# Patient Record
Sex: FEMALE | Race: WHITE | NOT HISPANIC OR LATINO | Employment: FULL TIME | URBAN - METROPOLITAN AREA
[De-identification: names, ages, dates, MRNs, and addresses within clinical notes are randomized per-mention and may not be internally consistent; named-entity substitution may affect disease eponyms.]

---

## 2018-12-03 ENCOUNTER — OFFICE VISIT (OUTPATIENT)
Dept: URGENT CARE | Facility: CLINIC | Age: 28
End: 2018-12-03
Payer: COMMERCIAL

## 2018-12-03 VITALS
WEIGHT: 120 LBS | DIASTOLIC BLOOD PRESSURE: 72 MMHG | TEMPERATURE: 98 F | HEART RATE: 91 BPM | HEIGHT: 64 IN | RESPIRATION RATE: 16 BRPM | BODY MASS INDEX: 20.49 KG/M2 | OXYGEN SATURATION: 97 % | SYSTOLIC BLOOD PRESSURE: 112 MMHG

## 2018-12-03 DIAGNOSIS — J98.8 RTI (RESPIRATORY TRACT INFECTION): ICD-10-CM

## 2018-12-03 DIAGNOSIS — J20.9 ACUTE BRONCHITIS WITH BRONCHOSPASM: ICD-10-CM

## 2018-12-03 PROCEDURE — 99203 OFFICE O/P NEW LOW 30 MIN: CPT | Performed by: EMERGENCY MEDICINE

## 2018-12-03 RX ORDER — DOXYCYCLINE HYCLATE 100 MG
100 TABLET ORAL 2 TIMES DAILY
Qty: 14 TAB | Refills: 0 | Status: SHIPPED | OUTPATIENT
Start: 2018-12-03 | End: 2018-12-10

## 2018-12-03 RX ORDER — PREDNISONE 20 MG/1
40 TABLET ORAL DAILY
Qty: 10 TAB | Refills: 0 | Status: SHIPPED | OUTPATIENT
Start: 2018-12-03 | End: 2020-11-03

## 2018-12-03 ASSESSMENT — ENCOUNTER SYMPTOMS
VOMITING: 0
WHEEZING: 1
COUGH: 1
SORE THROAT: 1
SPUTUM PRODUCTION: 1
SINUS PAIN: 0
FEVER: 0
DIARRHEA: 0
RHINORRHEA: 0
SHORTNESS OF BREATH: 1
HEMOPTYSIS: 0
CHILLS: 1

## 2018-12-03 NOTE — PATIENT INSTRUCTIONS
Go to the nearest hospital Emergency Department, or call 911, if any worsening breathing condition.  Bronchospasm, Adult  A bronchospasm is when the tubes that carry air in and out of your lungs (airways) spasm or tighten. During a bronchospasm it is hard to breathe. This is because the airways get smaller. A bronchospasm can be triggered by:  · Allergies. These may be to animals, pollen, food, or mold.  · Infection. This is a common cause of bronchospasm.  · Exercise.  · Irritants. These include pollution, cigarette smoke, strong odors, aerosol sprays, and paint fumes.  · Weather changes.  · Stress.  · Being emotional.  Follow these instructions at home:  · Always have a plan for getting help. Know when to call your doctor and local emergency services (911 in the [place] Know where you can get emergency care.  · Only take medicines as told by your doctor.  · If you were prescribed an inhaler or nebulizer machine, ask your doctor how to use it correctly. Always use a spacer with your inhaler if you were given one.  · Stay calm during an attack. Try to relax and breathe more slowly.  · Control your home environment:  ¨ Change your heating and air conditioning filter at least once a month.  ¨ Limit your use of fireplaces and wood stoves.  ¨ Do not  smoke. Do not  allow smoking in your home.  ¨ Avoid perfumes and fragrances.  ¨ Get rid of pests (such as roaches and mice) and their droppings.  ¨ Throw away plants if you see mold on them.  ¨ Keep your house clean and dust free.  ¨ Replace carpet with wood, tile, or vinyl carey. Carpet can trap dander and dust.  ¨ Use allergy-proof pillows, mattress covers, and box spring covers.  ¨ Wash bed sheets and blankets every week in hot water. Dry them in a dryer.  ¨ Use blankets that are made of polyester or cotton.  ¨ Wash hands frequently.  Contact a doctor if:  · You have muscle aches.  · You have chest pain.  · The thick spit you spit or cough up (sputum) changes from  clear or white to yellow, green, gray, or bloody.  · The thick spit you spit or cough up gets thicker.  · There are problems that may be related to the medicine you are given such as:  ¨ A rash.  ¨ Itching.  ¨ Swelling.  ¨ Trouble breathing.  Get help right away if:  · You feel you cannot breathe or catch your breath.  · You cannot stop coughing.  · Your treatment is not helping you breathe better.  · You have very bad chest pain.  This information is not intended to replace advice given to you by your health care provider. Make sure you discuss any questions you have with your health care provider.  Document Released: 10/15/2010 Document Revised: 05/25/2017 Document Reviewed: 06/10/2014  Elsevier Interactive Patient Education © 2017 [employer]

## 2018-12-03 NOTE — PROGRESS NOTES
Subjective:      Hawa Grimm is a 28 y.o. female who presents with Cough (x1 week productive cough, sob, sinus pressure, post nasal drippage, body aches, )             Cough    This is a new problem. Episode onset: over one week.  The problem has been gradually worsening. The cough is productive of sputum. Associated symptoms include chills, ear congestion, nasal congestion, a sore throat, shortness of breath and wheezing. Pertinent negatives include no chest pain, ear pain, fever, hemoptysis, rash or rhinorrhea.  Nothing aggravates the symptoms. She has tried a beta-agonist inhaler for the symptoms. The treatment provided moderate relief. Her past medical history is significant for bronchitis. There is no history of environmental allergies.       Review of Systems   Constitutional: Positive for chills and malaise/fatigue. Negative for fever.   HENT: Positive for congestion and sore throat. Negative for ear discharge, ear pain, hearing loss, nosebleeds, rhinorrhea, sinus pain and tinnitus.    Respiratory: Positive for cough, sputum production, shortness of breath and wheezing. Negative for hemoptysis.    Cardiovascular: Negative for chest pain.   Gastrointestinal: Negative for diarrhea and vomiting.   Skin: Negative for rash.   Endo/Heme/Allergies: Negative for environmental allergies.     PMH:  has a past medical history of Calcaneus fracture.  MEDS:   Current Outpatient Prescriptions:   •  predniSONE (DELTASONE) 20 MG Tab, Take 2 Tabs by mouth every day., Disp: 10 Tab, Rfl: 0  •  Albuterol Sulfate 108 (90 Base) MCG/ACT AEROSOL POWDER, BREATH ACTIVATED, Inhale 1-2 Puffs by mouth every 6 hours as needed., Disp: 1 Each, Rfl: 0  •  beclomethasone (QVAR) 80 MCG/ACT inhaler, Inhale 1 Puff by mouth 2 times a day. Use for 2-3 weeks, Disp: 7.3 g, Rfl: 0  •  doxycycline (VIBRAMYCIN) 100 MG Tab, Take 1 Tab by mouth 2 times a day for 7 days., Disp: 14 Tab, Rfl: 0  ALLERGIES: No Known Allergies  SURGHX:   Past Surgical  "History:   Procedure Laterality Date   • CALCANEUS ORIF  1/8/2012    Performed by RAYMOND LAZO at SURGERY [facility]     SOCHX:  reports that she has been smoking.  She has never used smokeless tobacco. She reports that she drinks alcohol. She reports that she uses drugs.  FH: family history is not on file.       Objective:     /72   Pulse 91   Temp 36.7 °C (98 °F) (Temporal)   Resp 16   Ht 1.626 m (5' 4\")   Wt 54.4 kg (120 lb)   SpO2 97%   BMI 20.60 kg/m²       Physical Exam   Constitutional: She is oriented to person, place, and time. She appears well-developed and well-nourished. She is cooperative. She does not have a sickly appearance. She does not appear ill. No distress.   HENT:   Head: Normocephalic.   Right Ear: Tympanic membrane and ear canal normal.   Left Ear: Tympanic membrane and ear canal normal.   Nose: Mucosal edema present. No rhinorrhea. Right sinus exhibits no maxillary sinus tenderness and no frontal sinus tenderness. Left sinus exhibits no maxillary sinus tenderness and no frontal sinus tenderness.   Mouth/Throat: Mucous membranes are normal. No trismus in the jaw. No uvula swelling. Posterior oropharyngeal erythema present. No oropharyngeal exudate or posterior oropharyngeal edema.   Eyes: Conjunctivae and lids are normal.   Neck: Trachea normal and phonation normal. Neck supple. No JVD present.   Cardiovascular: Normal rate, regular rhythm and normal heart sounds.    No murmur heard.  No significant pedal edema.   Pulmonary/Chest: No accessory muscle usage. No tachypnea. No respiratory distress. She has no decreased breath sounds. She has wheezes. She has no rhonchi. She has no rales.   Lymphadenopathy:     She has no cervical adenopathy.   Neurological: She is alert and oriented to person, place, and time.   Skin: Skin is warm and dry.   Psychiatric: She has a normal mood and affect.          Advise consideration of chest x-ray; patient declined, has follow-up visit " with PCP in 2 days if needed.     Assessment/Plan:     1. Acute bronchitis with bronchospasm  - predniSONE (DELTASONE) 20 MG Tab; Take 2 Tabs by mouth every day.  Dispense: 10 Tab; Refill: 0  - Albuterol Sulfate 108 (90 Base) MCG/ACT AEROSOL POWDER, BREATH ACTIVATED; Inhale 1-2 Puffs by mouth every 6 hours as needed.  Dispense: 1 Each; Refill: 0  - beclomethasone (QVAR) 80 MCG/ACT inhaler; Inhale 1 Puff by mouth 2 times a day. Use for 2-3 weeks  Dispense: 7.3 g; Refill: 0  - REFERRAL TO FOLLOW-UP WITH PRIMARY CARE    2. RTI (respiratory tract infection)  Based on duration, concern for evolving bronchopneumonia process.  - doxycycline (VIBRAMYCIN) 100 MG Tab; Take 1 Tab by mouth 2 times a day for 7 days.  Dispense: 14 Tab; Refill: 0  - REFERRAL TO FOLLOW-UP WITH PRIMARY CARE

## 2020-10-22 ENCOUNTER — HOSPITAL ENCOUNTER (OUTPATIENT)
Facility: MEDICAL CENTER | Age: 30
End: 2020-10-22
Attending: SPECIALIST | Admitting: SPECIALIST
Payer: MEDICAID

## 2020-11-03 ENCOUNTER — PRE-ADMISSION TESTING (OUTPATIENT)
Dept: ADMISSIONS | Facility: MEDICAL CENTER | Age: 30
End: 2020-11-03
Attending: SPECIALIST
Payer: MEDICAID

## 2020-11-03 RX ORDER — SERTRALINE HYDROCHLORIDE 25 MG/1
25 TABLET, FILM COATED ORAL DAILY
COMMUNITY

## 2020-11-03 SDOH — HEALTH STABILITY: MENTAL HEALTH: HOW OFTEN DO YOU HAVE 6 OR MORE DRINKS ON ONE OCCASION?: DAILY OR ALMOST DAILY

## 2020-11-10 NOTE — H&P
DATE OF PROCEDURE:  11/13/2020    IDENTIFICATION:  The patient is a very pleasant 30-year-old primipara (para 1   with 1 previous vaginal delivery and her daughter is 5 years old).    CHIEF COMPLAINT:  The patient is parous and desires permanent tubal   sterilization.    HISTORY OF PRESENT ILLNESS:  The patient is scheduled today for laparoscopic   bilateral tubal sterilization procedure, namely laparoscopic bilateral   salpingectomy.  She would also like for me to remove her IUD at the time of   laparoscopic bilateral salpingectomy.  I have discussed with her and explained   to her in detail and at length what laparoscopic bilateral salpingectomy is   and what laparoscopic bilateral salpingectomy involves and I have discussed   with her and explained to her in detail and at length the risks and benefits   and alternatives of laparoscopic bilateral salpingectomy.  After our   discussions today and after answering her questions, she told me that she very   much wishes for us to proceed with laparoscopic bilateral salpingectomy and   she asked me to remove her IUD at the time of laparoscopic bilateral   salpingectomy.    PAST MEDICAL HISTORY:  The patient has a history of asthma, but otherwise has   no medical illnesses.    PAST SURGICAL HISTORY:  The patient has had foot surgery twice and a tooth   extraction.    MEDICATIONS:  The patient says she takes sertraline and says she takes no   other medications other than over-the-counter medications for seasonal   allergies, but she does not take these every day.    ALLERGIES:  The patient says she has no known drug allergies.    SOCIAL HISTORY:  The patient occasionally smokes but infrequently and at most   maybe 1 cigarette per day.  She occasionally drinks alcoholic beverages.  She   occasionally smokes marijuana.    REVIEW OF SYSTEMS:  GENERAL:  No fevers or chills or sweats.  PULMONARY:  No coughing or wheezing or chest pain or shortness of breath other   than for  maybe some mild intermittent brief wheezing due to her asthma.  She   denies any shortness of breath.  CARDIOVASCULAR:  The patient denies any palpitations, chest pain or dyspnea.  GENITOURINARY:  The patient denies any menorrhagia or dysmenorrhea or dysuria   or hematuria.  MUSCULOSKELETAL:  No arthralgias or myalgias.  NEUROLOGIC:  No headaches or syncope or seizures.    PHYSICAL EXAMINATION:  VITAL SIGNS:  Patient's vital signs are stable and she is afebrile.  GENERAL:  Well developed, well nourished, in no apparent distress.  HEENT:  Normocephalic, atraumatic.  Pupils equal, round, reactive to light and   accommodation.  Extraocular motions intact.  NECK:  There is no thyromegaly.  CHEST AND HEART:  Regular rate and rhythm.  No murmur.  LUNGS:  Clear to auscultation bilaterally.  ABDOMEN:  The abdomen is soft and flat, and nontender and nondistended.  There   is no hepatomegaly and there is no splenomegaly.  PELVIC:  Speculum exam was performed and reveals that there are no vulvar or   vaginal or cervical lesions and no cervical or vaginal discharge.  The IUD   strings are seen at the external cervical os.  In no part of the body, the IUD   is seen.  The vulva and vaginal mucosa are well estrogenized.  Bimanual exam   reveals no evidence of cervical motion tenderness and no evidence of any   tenderness to palpation of the uterine corpus and no evidence of any adnexal   masses or tenderness either on the right or the left.  EXTREMITIES:  No clubbing, cyanosis or edema.  NEUROLOGICAL:  Nonfocal.    ASSESSMENT:  1.  The patient is parous and desires permanent tubal sterilization, namely   laparoscopic bilateral salpingectomy.  2.  The patient would like for me to remove her IUD at the time of this   procedure.    PLAN:  We will proceed with laparoscopic bilateral salpingectomy and IUD   removal.  Please see above.       ____________________________________     MD FAUSTO Cagle / NTS    DD:  11/10/2020  09:05:07  DT:  11/10/2020 09:23:54    D#:  [id number]  Job#:  [id number]

## 2020-11-11 ENCOUNTER — PRE-ADMISSION TESTING (OUTPATIENT)
Dept: ADMISSIONS | Facility: MEDICAL CENTER | Age: 30
End: 2020-11-11
Attending: SPECIALIST
Payer: MEDICAID

## 2020-11-11 DIAGNOSIS — Z01.812 PRE-OPERATIVE LABORATORY EXAMINATION: ICD-10-CM

## 2020-11-11 LAB
ANION GAP SERPL CALC-SCNC: 10 MMOL/L (ref 7–16)
BUN SERPL-MCNC: 7 MG/DL (ref 8–22)
CALCIUM SERPL-MCNC: 9.8 MG/DL (ref 8.5–10.5)
CHLORIDE SERPL-SCNC: 101 MMOL/L (ref 96–112)
CO2 SERPL-SCNC: 26 MMOL/L (ref 20–33)
COVID ORDER STATUS COVID19: NORMAL
CREAT SERPL-MCNC: 0.78 MG/DL (ref 0.5–1.4)
ERYTHROCYTE [DISTWIDTH] IN BLOOD BY AUTOMATED COUNT: 46.7 FL (ref 35.9–50)
GLUCOSE SERPL-MCNC: 101 MG/DL (ref 65–99)
HCG SERPL QL: NEGATIVE
HCT VFR BLD AUTO: 47.8 % (ref 37–47)
HGB BLD-MCNC: 15.6 G/DL (ref 12–16)
MCH RBC QN AUTO: 32.2 PG (ref 27–33)
MCHC RBC AUTO-ENTMCNC: 32.6 G/DL (ref 33.6–35)
MCV RBC AUTO: 98.6 FL (ref 81.4–97.8)
PLATELET # BLD AUTO: 251 K/UL (ref 164–446)
PMV BLD AUTO: 10.1 FL (ref 9–12.9)
POTASSIUM SERPL-SCNC: 5 MMOL/L (ref 3.6–5.5)
RBC # BLD AUTO: 4.85 M/UL (ref 4.2–5.4)
SARS-COV-2 RNA RESP QL NAA+PROBE: DETECTED
SODIUM SERPL-SCNC: 137 MMOL/L (ref 135–145)
SPECIMEN SOURCE: ABNORMAL
WBC # BLD AUTO: 4.9 K/UL (ref 4.8–10.8)

## 2020-11-11 PROCEDURE — U0003 INFECTIOUS AGENT DETECTION BY NUCLEIC ACID (DNA OR RNA); SEVERE ACUTE RESPIRATORY SYNDROME CORONAVIRUS 2 (SARS-COV-2) (CORONAVIRUS DISEASE [COVID-19]), AMPLIFIED PROBE TECHNIQUE, MAKING USE OF HIGH THROUGHPUT TECHNOLOGIES AS DESCRIBED BY CMS-2020-01-R: HCPCS

## 2020-11-11 PROCEDURE — 84703 CHORIONIC GONADOTROPIN ASSAY: CPT

## 2020-11-11 PROCEDURE — 85027 COMPLETE CBC AUTOMATED: CPT

## 2020-11-11 PROCEDURE — C9803 HOPD COVID-19 SPEC COLLECT: HCPCS

## 2020-11-11 PROCEDURE — 36415 COLL VENOUS BLD VENIPUNCTURE: CPT

## 2020-11-11 PROCEDURE — 80048 BASIC METABOLIC PNL TOTAL CA: CPT

## 2020-11-11 NOTE — OR NURSING
COVID-19 Pre-surgery screening: Preadmit testing 11/11/20    1. Do you have an undiagnosed respiratory illness or symptoms such as coughing or sneezing? no    2. Do you have an unexplained fever greater than 100.4 degrees Fahrenheit or 38 degrees Celsius? no     3. Have you had direct exposure to a patient who tested positive for Covid-19?   no    4. Have you had any loss of your sense of taste or smell? Have you had N/V or sore throat? no    Patient has been informed of visitor policy and asked to wear a mask upon entering the hospital Yes

## 2020-11-12 NOTE — OR NURSING
Pt called and informed of Covid results, she denies any symptoms at this time. Pt instructed to self isolate and contact the health department, pt verbalized understanding. Call placed to Dr Peterson' office and informed Karmen, , of positive result.

## 2023-12-29 ENCOUNTER — HOSPITAL ENCOUNTER (EMERGENCY)
Facility: MEDICAL CENTER | Age: 33
End: 2023-12-29
Attending: EMERGENCY MEDICINE
Payer: COMMERCIAL

## 2023-12-29 VITALS
RESPIRATION RATE: 18 BRPM | BODY MASS INDEX: 19.63 KG/M2 | HEART RATE: 104 BPM | TEMPERATURE: 98.2 F | DIASTOLIC BLOOD PRESSURE: 118 MMHG | HEIGHT: 64 IN | OXYGEN SATURATION: 99 % | WEIGHT: 115 LBS | SYSTOLIC BLOOD PRESSURE: 177 MMHG

## 2023-12-29 DIAGNOSIS — F14.10 COCAINE ABUSE (HCC): Primary | ICD-10-CM

## 2023-12-29 LAB — EKG IMPRESSION: NORMAL

## 2023-12-29 PROCEDURE — 99284 EMERGENCY DEPT VISIT MOD MDM: CPT

## 2023-12-29 PROCEDURE — 93005 ELECTROCARDIOGRAM TRACING: CPT | Performed by: EMERGENCY MEDICINE

## 2023-12-30 NOTE — ED NOTES
Pt appears to have eloped from ER exam room. No belongings left in room, not in restroom. MD notified.

## 2023-12-30 NOTE — ED PROVIDER NOTES
ED Provider Note    Scribed for Adrian Stiles by Trip Lyles. 12/29/2023  10:17 PM    Primary care provider: Pcp Pt States None  Means of arrival: walk in  History obtained from: Patient  History limited by: None    CHIEF COMPLAINT  Chief Complaint   Patient presents with    Psych Eval    Chest Pain    Drug Ingestion     Patient arrives after injecting cocaine last night. Patient states that needle broke off in her vein and is traveling, and she can feel it in her left arm near her bicep.     EXTERNAL RECORDS REVIEWED  Outpatient Notes Seen at urgent care on 12/11, diagnosed with sinusitis and ear infection    HPI/ROS    LIMITATION TO HISTORY   Select: : None    HPI  Hawa Grimm is a 33 y.o. female who presents to the Emergency Department for drug ingestion onset prior to arrival. She states that a piece of a needle broke off, but she could not find the piece. She rubbed her right arm against her leg and that is when she thinks the needle went into her skin. She sees a hole to the arm, and has been scratching at that area. She also notes some pain to the region. She just relapsed back onto cocaine after one week of sobriety. She has been using fairly regularly.    REVIEW OF SYSTEMS  As above, all other systems reviewed and are negative.   See HPI for further details.     PAST MEDICAL HISTORY   has a past medical history of Asthma (11/03/2020), Calcaneus fracture, and Psychiatric problem (11/03/2020).    SURGICAL HISTORY   has a past surgical history that includes calcaneus orif (1/8/2012).    SOCIAL HISTORY  Social History     Tobacco Use    Smoking status: Every Day     Current packs/day: 1.00     Average packs/day: 1 pack/day for 10.0 years (10.0 ttl pk-yrs)     Types: Cigarettes    Smokeless tobacco: Never   Substance Use Topics    Alcohol use: Yes     Comment: 1 per day    Drug use: Yes     Types: Intravenous, Injected (Skin Popping)     Comment: heroin hx      Social History     Substance and  "Sexual Activity   Drug Use Yes    Types: Intravenous, Injected (Skin Popping)    Comment: heroin hx     FAMILY HISTORY  History reviewed. No pertinent family history.  CURRENT MEDICATIONS  Home Medications       Reviewed by Monroe Waldrop R.N. (Registered Nurse) on 12/29/23 at 2115  Med List Status: Not Addressed     Medication Last Dose Status   Albuterol Sulfate 108 (90 Base) MCG/ACT AEROSOL POWDER, BREATH ACTIVATED  Active   sertraline (ZOLOFT) 25 MG tablet  Active                  ALLERGIES  No Known Allergies    PHYSICAL EXAM    VITAL SIGNS:   Vitals:    12/29/23 2106 12/29/23 2112   BP: (!) 177/118    Pulse: (!) 104    Resp: 18    Temp: 36.8 °C (98.2 °F)    TempSrc: Temporal    SpO2: 99%    Weight:  52.2 kg (115 lb)   Height:  1.626 m (5' 4\")     Vitals: My interpretation: hypertensive, borderline tachycardic, afebrile, not hypoxic    Cardiac Monitor Interpretation: The cardiac monitor revealed normal Sinus Rhythm as interpreted by me. The cardiac monitor was ordered secondary to the patient's history of cocaine usage and to monitor for dysrhythmia and/or tachycardia.    PE:   Gen: sitting comfortably, speaking clearly, appears in no acute distress   ENT: Mucous membranes moist, posterior pharynx clear, uvula midline, nares patent bilaterally   Neck: Supple, FROM  Pulmonary: Lungs are clear to auscultation bilaterally. No tachypnea  CV:  RRR, no murmur appreciated, pulses 2+ in both upper and lower extremities  Abdomen: soft, NT/ND; no rebound/guarding  : no CVA or suprapubic tenderness   Neuro: A&Ox4 (person, place, time, situation), speech fluent, gait steady, no focal deficits appreciated  Skin: No rash or lesions.  No pallor or jaundice.  No cyanosis.  Warm and dry.  Track marks are present on the arms mostly on the right, no abscess or induration, no foreign body appreciated    DIAGNOSTIC STUDIES / PROCEDURES    LABS  Results for orders placed or performed during the hospital encounter of 12/29/23   EKG "   Result Value Ref Range    Report       [facility] Emergency Dept.    Test Date:  2023  Pt Name:    HAWA DEAN                 Department: ER  MRN:        [id number]                      Room:  Gender:     Female                       Technician: [id number]  :        1990                   Requested By:ER TRIAGE PROTOCOL  Order #:    [id number]                    Reading MD: Adrian Stiles    Measurements  Intervals                                Axis  Rate:       95                           P:          69  OK:         161                          QRS:        6  QRSD:       96                           T:          33  QT:         378  QTc:        475    Interpretive Statements  Sinus rhythm  Borderline prolonged QT interval  Baseline wander in lead(s) V1,V2  No previous ECG available for comparison  Electronically Signed On 2023 23:04:31 PST by Adrian Stiles        COURSE & MEDICAL DECISION MAKING  Nursing notes, VS, PMSFHx, labs, imaging, EKG reviewed in chart.    ED Observation Status? No; Patient does not meet criteria for ED Observation.     Ddx: Cocaine induced psychosis, delusional parasitosis, foreign body, cellulitis, abscess    MDM: 10:17 PM Hawa Dean is a 33 y.o. female who presented with evaluation for possible foreign body.  Patient has heavy cocaine use, for the past week she has been injecting cocaine into her right arm.  She was concerned that maybe a needle broke off in her arm.  She is convinced it has been traveling up and down her arm through her veins or in the skin, she is unclear.  Presents here hypertensive and tachycardic likely secondary to recent cocaine use which she admits to.  She has some track marks on her arm but no obvious foreign body and low suspicion at this time for retained foreign body.  I did not palpate any abscess, induration, fluctuance and no area that appears to have foreign body retention.  I discussed with her that I  would go and get an ultrasound machine and do an ultrasound to verify that there is no metallic foreign body.  However on my return, patient had eloped.  Overall I have low suspicion and I think she was stable for discharge and would have plan to discharge her shortly afterwards.  Nursing staff unable to find the patient.  If she returns I will be happy to finish her workup.    ADDITIONAL PROBLEM LIST AND DISPOSITION    Discussion of management with other QHP or appropriate source(s): None     Escalation of care considered, and ultimately not performed:diagnostic imaging    Barriers to care at this time, including but not limited to: Patient does not have established PCP.     FINAL IMPRESSION  1. Cocaine abuse (HCC) Acute       Trip AVILEZ (Scribe), am scribing for, and in the presence of, Adrian Stiles.    Electronically signed by: Trip Lyles (Josefina), 12/29/2023    IAdrian personally performed the services described in this documentation, as scribed by Trip Lyles in my presence, and it is both accurate and complete.    The note accurately reflects work and decisions made by me.  Adrian Stiles  12/29/2023  11:05 PM

## 2023-12-30 NOTE — ED TRIAGE NOTES
"Chief Complaint   Patient presents with    Psych Eval    Chest Pain    Drug Ingestion     Patient arrives after injecting cocaine last night. Patient states that needle broke off in her vein and is traveling, and she can feel it in her left arm near her bicep.       Pt is alert and oriented, speaking in full sentences, follows commands and responds appropriately to questions. Resperations are even and unlabored.      Pt placed in lobby. Pt educated on triage process. Pt encouraged to alert staff for any changes.     Patient and staff wearing appropriate PPE.    BP (!) 177/118   Pulse (!) 104   Temp 36.8 °C (98.2 °F) (Temporal)   Resp 18   Ht 1.626 m (5' 4\")   Wt 52.2 kg (115 lb)   SpO2 99%    "

## 2023-12-30 NOTE — ED NOTES
"Pt states she broke off the needle and then she felt a prick on her forearm and believes the needle might have been on her pants. No needle visible. Pt believes needle has traveled up vein into bicep. Pt states that she keeps seeing the needle \"poke his little head out\" in different spots. Pt states she was 12 years sober and injected cocaine tonight   "

## 2024-01-13 ENCOUNTER — HOSPITAL ENCOUNTER (EMERGENCY)
Facility: MEDICAL CENTER | Age: 34
End: 2024-01-13
Attending: STUDENT IN AN ORGANIZED HEALTH CARE EDUCATION/TRAINING PROGRAM
Payer: COMMERCIAL

## 2024-01-13 ENCOUNTER — APPOINTMENT (OUTPATIENT)
Dept: RADIOLOGY | Facility: MEDICAL CENTER | Age: 34
End: 2024-01-13
Attending: STUDENT IN AN ORGANIZED HEALTH CARE EDUCATION/TRAINING PROGRAM

## 2024-01-13 VITALS
OXYGEN SATURATION: 98 % | BODY MASS INDEX: 20.49 KG/M2 | HEIGHT: 64 IN | RESPIRATION RATE: 19 BRPM | WEIGHT: 120 LBS | HEART RATE: 74 BPM | TEMPERATURE: 98.2 F | DIASTOLIC BLOOD PRESSURE: 82 MMHG | SYSTOLIC BLOOD PRESSURE: 120 MMHG

## 2024-01-13 DIAGNOSIS — F14.90 COCAINE USE: ICD-10-CM

## 2024-01-13 DIAGNOSIS — R09.A9 FOREIGN BODY SENSATION, OTHER SITE: ICD-10-CM

## 2024-01-13 DIAGNOSIS — F15.959: ICD-10-CM

## 2024-01-13 LAB — HCG UR QL: NEGATIVE

## 2024-01-13 PROCEDURE — 700102 HCHG RX REV CODE 250 W/ 637 OVERRIDE(OP): Performed by: STUDENT IN AN ORGANIZED HEALTH CARE EDUCATION/TRAINING PROGRAM

## 2024-01-13 PROCEDURE — A9270 NON-COVERED ITEM OR SERVICE: HCPCS | Performed by: STUDENT IN AN ORGANIZED HEALTH CARE EDUCATION/TRAINING PROGRAM

## 2024-01-13 PROCEDURE — 71045 X-RAY EXAM CHEST 1 VIEW: CPT

## 2024-01-13 PROCEDURE — 81025 URINE PREGNANCY TEST: CPT

## 2024-01-13 PROCEDURE — 99283 EMERGENCY DEPT VISIT LOW MDM: CPT

## 2024-01-13 PROCEDURE — 93005 ELECTROCARDIOGRAM TRACING: CPT | Performed by: STUDENT IN AN ORGANIZED HEALTH CARE EDUCATION/TRAINING PROGRAM

## 2024-01-13 RX ORDER — DIAZEPAM 2 MG/1
2 TABLET ORAL ONCE
Status: COMPLETED | OUTPATIENT
Start: 2024-01-13 | End: 2024-01-13

## 2024-01-13 RX ADMIN — DIAZEPAM 2 MG: 2 TABLET ORAL at 21:02

## 2024-01-13 ASSESSMENT — PAIN DESCRIPTION - PAIN TYPE: TYPE: ACUTE PAIN

## 2024-01-14 LAB — EKG IMPRESSION: NORMAL

## 2024-01-14 NOTE — ED NOTES
PT discussed DC with ERP. No further questions at this time. PT declined to sign discharge but did take paperwork. PT ambulated to lobby with a steady gait.

## 2024-01-14 NOTE — ED TRIAGE NOTES
.  Chief Complaint   Patient presents with    Other    Anxiety     Pt ambulated to triage. Anxious. Admits to IV drug use unsure what. Pt is paranoid and feels that she has something in her vasculature. Pt rambling at triage denies SI/HI. Very concerned that she has something wrong and that it is moving around her body. Currently has pressure to left neck below ear.

## 2024-01-14 NOTE — ED NOTES
PT ambulated to room with a steady gait. Changed into gown and placed on monitor with call light in reach. Chart up for ERP.

## 2024-01-14 NOTE — ED PROVIDER NOTES
ED Provider Note    CHIEF COMPLAINT  Chief Complaint   Patient presents with    Other    Anxiety       EXTERNAL RECORDS REVIEWED  Outpatient Notes patient was seen in the emergency department 12/31/2023 with a foreign body sensation in her arm after utilizing IV cocaine.  X-ray demonstrated no such foreign body which was reassuring to the patient and resolved her symptoms.  Suspected delusions in the setting of cocaine abuse.    HPI/ROS  LIMITATION TO HISTORY   Select: : None  OUTSIDE HISTORIAN(S):  Family sister at bedside who reports that following this medical evaluation they have a plan to seek care at a inpatient substance abuse facility.    Hawa Grimm is a 33 y.o. female who presents to the emergency department for evaluation of foreign body sensation traveling throughout her arms chest back and legs.  Patient reports that this sensation becomes more acute after she utilizes cocaine which she injects.  She states that she last experienced this sensation after shooting up cocaine earlier today.  She states she is very concerned that she is having a blood clot or has a traveling needle in her vasculature that is moving around.  She denies any symptoms currently.  She states she wants to get help for her cocaine abuse disorder and is here with her sister.    PAST MEDICAL HISTORY   has a past medical history of Asthma (11/03/2020), Calcaneus fracture, and Psychiatric problem (11/03/2020).    SURGICAL HISTORY   has a past surgical history that includes calcaneus orif (1/8/2012).    FAMILY HISTORY  History reviewed. No pertinent family history.    SOCIAL HISTORY  Social History     Tobacco Use    Smoking status: Every Day     Current packs/day: 1.00     Average packs/day: 1 pack/day for 10.0 years (10.0 ttl pk-yrs)     Types: Cigarettes    Smokeless tobacco: Never   Vaping Use    Vaping Use: Not on file   Substance and Sexual Activity    Alcohol use: Yes     Comment: 1 per day    Drug use: Yes     Types:  "Intravenous, Injected (Skin Popping)     Comment: IV drug use    Sexual activity: Not on file       CURRENT MEDICATIONS  Home Medications       Reviewed by Ivette Orozco R.N. (Registered Nurse) on 01/13/24 at 1847  Med List Status: Partial     Medication Last Dose Status   Albuterol Sulfate 108 (90 Base) MCG/ACT AEROSOL POWDER, BREATH ACTIVATED  Active   sertraline (ZOLOFT) 25 MG tablet  Active                    ALLERGIES  No Known Allergies    PHYSICAL EXAM  VITAL SIGNS: BP (!) 145/102   Pulse (!) 108   Temp 36.2 °C (97.1 °F) (Temporal)   Resp 20   Ht 1.626 m (5' 4\")   Wt 54.4 kg (120 lb)   LMP  (LMP Unknown)   SpO2 99%   BMI 20.60 kg/m²    Constitutional: Anxious  HEENT: Atraumatic, normocephalic, pupils are equal round reactive to light, nose normal, mouth shows moist mucous membranes  Neck: Supple, no JVD, no tracheal deviation  Cardiovascular: Regular rate and rhythm, no murmur, rub or gallop, 2+ pulses peripherally x4  Thorax & Lungs: No respiratory distress, no wheezes, rales or rhonchi, no chest wall tenderness.  GI: Soft, non-distended, non-tender, no rebound  Skin: Warm, dry, no acute rash or lesion  Musculoskeletal: Moving all extremities, no acute deformity, no edema, no tenderness  Neurologic: A&Ox3, at baseline mentation, cranial nerves II through XII are grossly intact, no sensory deficit, no ataxia  Psychiatric: Anxious, perseverating about concern for blood clot versus foreign body and multiple additional intermittent symptoms.      DIAGNOSTIC STUDIES / PROCEDURES  EKG  I have independently interpreted this EKG  Results for orders placed or performed during the hospital encounter of 01/13/24   EKG (Now)   Result Value Ref Range    Report       [facility] Emergency Dept.    Test Date:  2024-01-13  Pt Name:    KENNETH DEAN                 Department: ER  MRN:        [id number]                      Room:       ORTHO  Gender:     Female                       Technician: " [id number]  :        1990                   Requested By:CADENCEMAAME LORA  Order #:    [id number]                    Reading MD:    Measurements  Intervals                                Axis  Rate:       71                           P:          81  MI:         159                          QRS:        64  QRSD:       88                           T:          53  QT:         434  QTc:        472    Interpretive Statements  Sinus rhythm  Compared to ECG 2023 21:15:42  No significant changes         LABS  Labs Reviewed   HCG QUALITATIVE UR       RADIOLOGY  I have independently interpreted the diagnostic imaging associated with this visit and am waiting the final reading from the radiologist.   My preliminary interpretation is as follows: Chest x-ray with no focal consolidation consistent with pneumonia, pneumothorax, cardiomegaly, foreign body    Radiologist interpretation:   DX-CHEST-PORTABLE (1 VIEW)   Final Result      1.  No acute cardiac or pulmonary abnormalities are identified.            COURSE & MEDICAL DECISION MAKING    ED Observation Status? No; Patient does not meet criteria for ED Observation.     INITIAL ASSESSMENT, COURSE AND PLAN  Care Narrative:     Patient representing the emergency department for evaluation of foreign body sensation, intermittent paresthesias, multiple vague medical complaints.  Hemodynamically stable with normal vital signs at the time of my evaluation.  Does have stigmata of recent cocaine use however with hyper activity, anxiety.  Suspect stimulant induced psychosis and possible developing parasitosis.  With respect to the patient's concerns for DVT or thromboembolism she is Wells negative with no clinical evidence of such on exam.  Given her vague complaints of chest discomfort an EKG and chest x-ray were performed that were likewise unremarkable.  She is not pregnant.  Discussed with her my suspicion that her symptoms are likely related to her ongoing cocaine use and  she is in agreement with this.  She is here with her sister who states that they have a plan to get the patient into an inpatient substance abuse facility for ongoing treatment.  I strongly encouraged him to keep this plan.  She was medicated with oral Valium with some improvement in symptoms while in the department.  I considered further laboratory and imaging workup however I do not feel that it is indicated at this point given more than likely symptomology related to ongoing stimulant abuse.  I encouraged her to follow-up with her primary care doctor and information was provided.  Ultimately she was discharged in stable condition with return precautions discussed.    ADDITIONAL PROBLEM LIST  Cocaine abuse disorder     DISPOSITION AND DISCUSSIONS  I have discussed management of the patient with the following physicians and VIKI's: None    Discussion of management with other [facility] or appropriate source(s): None     Escalation of care considered, and ultimately not performed:blood analysis and diagnostic imaging    Barriers to care at this time, including but not limited to: Patient does not have established PCP.     Decision tools and prescription drugs considered including, but not limited to:  Wells low risk .    FINAL IMPRESSION  1. Cocaine use    2. Stimulant-induced psychotic disorder (HCC)    3. Foreign body sensation, other site        PRESCRIPTIONS  Discharge Medication List as of 1/13/2024  9:14 PM          FOLLOW UP  [facility], Emergency Dept  [address] [address] [phone]    As needed, If symptoms worsen    To establish a primary care provider within our system, please call [phone]    Schedule an appointment as soon as possible for a visit           -DISCHARGE-    Electronically signed by: José Antonio Barnes M.D., 1/13/2024 8:06 PM

## 2024-01-14 NOTE — DISCHARGE INSTRUCTIONS
As we discussed please follow-up for substance abuse treatment and with a primary care doctor by calling the attached number.